# Patient Record
(demographics unavailable — no encounter records)

---

## 2024-10-10 NOTE — HISTORY OF PRESENT ILLNESS
[FreeTextEntry1] : aches and pain HTN DM follow up [de-identified] : shoulder  elbow aches and pains  rash persists used mupirocin some relief

## 2024-11-29 NOTE — DISCUSSION/SUMMARY
[FreeTextEntry1] : Tahira is referred  to dr shalom Matta or dr Ambriz. hypertension reasonable controls. .   5/17/23 would encourage patient to follow up with orthopedics for shoulder and cervical spine cardiac status stable  10/18/23 we will increase antihypertensives as indicated  EKG indicated for hypertension  this is a intermediate risk encounter based upon drug eval and mgment [EKG obtained to assist in diagnosis and management of assessed problem(s)] : EKG obtained to assist in diagnosis and management of assessed problem(s)

## 2024-11-29 NOTE — REASON FOR VISIT
[Hypertension] : hypertension [FreeTextEntry3] : dr reyes [FreeTextEntry1] : some left should pains on moment most consistent with musculoskeletal pains  update 6/9/22 we note systolic hypertension today of the readings at home have been better  update 9/8/22 blood pressure is poorly controlled will increase labetalol to 1500 mg per day. Claude reports right hand numbness she rubs it and it went away her finger was numb  update 2/101/23 complains of persistent atypical pains affecting arm positional in nature.   Update 11/10/2022 Claude has persistent pains mostly on elevating her left shoulder which sounds more like a musculoskeletal or even rotator cuff situation in addition to this she has numbness and tingling in her right hand which is of unclear significance the differential includes a musculoskeletal issue versus cardiac issue  update 5/17/23 pains and shoulder most likely musculoskeletal narrowed C4 C7 unstable C4 C5 MRI suggested of right shoulder she states however that she is "feeling better" in that regard  10/18/23 We note hypertension today and will increase labetalol to 3 (300) twice daily or 900 twice daily. Claude reluctant to undergo injections of her knee prefers to use a brace which is improving anecdotally heard a bad story about injection and would like to avoid waiting for bone-on-bone pains such as nighttime pains currently she says she does not have these.  1/17/2024 Claude lost a friend in [place] her blood pressures have been satisfactory at 136/75 blood sugar 110 on labetalol 900 twice daily probable whitecoat syndrome  5/15/24 claude has had significant weight loss on anorectic medication. her BP is stable and satisfactory.   11/27/24 Aleksander is noted to have severe 'white coat syndrome. her BP at home are satisfactroy

## 2024-11-29 NOTE — PHYSICAL EXAM
[General Appearance - Well Developed] : well developed [Normal Appearance] : normal appearance [Well Groomed] : well groomed [General Appearance - Well Nourished] : well nourished [No Deformities] : no deformities [General Appearance - In No Acute Distress] : no acute distress [Normal Conjunctiva] : the conjunctiva exhibited no abnormalities [Eyelids - No Xanthelasma] : the eyelids demonstrated no xanthelasmas [Normal Oral Mucosa] : normal oral mucosa [No Oral Pallor] : no oral pallor [No Oral Cyanosis] : no oral cyanosis [Normal Jugular Venous A Waves Present] : normal jugular venous A waves present [Normal Jugular Venous V Waves Present] : normal jugular venous V waves present [No Jugular Venous Figueroa A Waves] : no jugular venous figueroa A waves [Respiration, Rhythm And Depth] : normal respiratory rhythm and effort [Exaggerated Use Of Accessory Muscles For Inspiration] : no accessory muscle use [Auscultation Breath Sounds / Voice Sounds] : lungs were clear to auscultation bilaterally [Heart Rate And Rhythm] : heart rate and rhythm were normal [Heart Sounds] : normal S1 and S2 [Murmurs] : no murmurs present [Abdomen Soft] : soft [Abdomen Tenderness] : non-tender [Abdomen Mass (___ Cm)] : no abdominal mass palpated [Abnormal Walk] : normal gait [Gait - Sufficient For Exercise Testing] : the gait was sufficient for exercise testing [Nail Clubbing] : no clubbing of the fingernails [Cyanosis, Localized] : no localized cyanosis [Petechial Hemorrhages (___cm)] : no petechial hemorrhages [Skin Color & Pigmentation] : normal skin color and pigmentation [] : no rash [No Venous Stasis] : no venous stasis [Skin Lesions] : no skin lesions [No Skin Ulcers] : no skin ulcer [No Xanthoma] : no  xanthoma was observed [Oriented To Time, Place, And Person] : oriented to person, place, and time [Affect] : the affect was normal [Mood] : the mood was normal [No Anxiety] : not feeling anxious

## 2024-11-29 NOTE — ASSESSMENT
[FreeTextEntry1] : 1/17/2024 No new cardiac recommendations continue current course  5/15/24 No new cardiac recommendations continue current course  11/27/24 increase labetalol. edward will check he BP at home and call if greater than 150 systolic make 300 3 tabs bid  Medical necessity high risk encounter based upon drug evaluation and managment.

## 2025-01-09 NOTE — HISTORY OF PRESENT ILLNESS
[FreeTextEntry1] : hand numbness hearing not good [de-identified] : hands numbn in am not sleeping great

## 2025-01-28 NOTE — PHYSICAL EXAM
[General Appearance - Well Developed] : well developed [Normal Appearance] : normal appearance [Well Groomed] : well groomed [General Appearance - Well Nourished] : well nourished [No Deformities] : no deformities [General Appearance - In No Acute Distress] : no acute distress [Normal Conjunctiva] : the conjunctiva exhibited no abnormalities [Eyelids - No Xanthelasma] : the eyelids demonstrated no xanthelasmas [Normal Oral Mucosa] : normal oral mucosa [No Oral Pallor] : no oral pallor [No Oral Cyanosis] : no oral cyanosis [Normal Jugular Venous A Waves Present] : normal jugular venous A waves present [Normal Jugular Venous V Waves Present] : normal jugular venous V waves present [No Jugular Venous Figueroa A Waves] : no jugular venous figueroa A waves [Respiration, Rhythm And Depth] : normal respiratory rhythm and effort [Exaggerated Use Of Accessory Muscles For Inspiration] : no accessory muscle use [Heart Rate And Rhythm] : heart rate and rhythm were normal [Auscultation Breath Sounds / Voice Sounds] : lungs were clear to auscultation bilaterally [Heart Sounds] : normal S1 and S2 [Murmurs] : no murmurs present [Abdomen Soft] : soft [Abdomen Tenderness] : non-tender [Abdomen Mass (___ Cm)] : no abdominal mass palpated [Abnormal Walk] : normal gait [Gait - Sufficient For Exercise Testing] : the gait was sufficient for exercise testing [Nail Clubbing] : no clubbing of the fingernails [Cyanosis, Localized] : no localized cyanosis [Petechial Hemorrhages (___cm)] : no petechial hemorrhages [Skin Color & Pigmentation] : normal skin color and pigmentation [] : no rash [No Venous Stasis] : no venous stasis [Skin Lesions] : no skin lesions [No Skin Ulcers] : no skin ulcer [No Xanthoma] : no  xanthoma was observed [Oriented To Time, Place, And Person] : oriented to person, place, and time [Affect] : the affect was normal [Mood] : the mood was normal [No Anxiety] : not feeling anxious

## 2025-01-28 NOTE — ASSESSMENT
[FreeTextEntry1] : 1/17/2024 No new cardiac recommendations continue current course  5/15/24 No new cardiac recommendations continue current course  11/27/24 increase labetalol. Marjorie will check he BP at home and call if greater than 150 systolic make 300 3 tabs bid  1/28/2025 Increase labetalol to 900 twice daily White coat syndrome likely  Medical necessity Intermediate risk encounter based upon drug evaluation and management.

## 2025-01-28 NOTE — REASON FOR VISIT
[Hypertension] : hypertension [FreeTextEntry1] : some left should pains on moment most consistent with musculoskeletal pains  update 6/9/22 we note systolic hypertension today of the readings at home have been better  update 9/8/22 blood pressure is poorly controlled will increase labetalol to 1500 mg per day. Claude reports right hand numbness she rubs it and it went away her finger was numb  update 2/101/23 complains of persistent atypical pains affecting arm positional in nature.   Update 11/10/2022 Claude has persistent pains mostly on elevating her left shoulder which sounds more like a musculoskeletal or even rotator cuff situation in addition to this she has numbness and tingling in her right hand which is of unclear significance the differential includes a musculoskeletal issue versus cardiac issue  update 5/17/23 pains and shoulder most likely musculoskeletal narrowed C4 C7 unstable C4 C5 MRI suggested of right shoulder she states however that she is "feeling better" in that regard  10/18/23 We note hypertension today and will increase labetalol to 3 (300) twice daily or 900 twice daily. Claude reluctant to undergo injections of her knee prefers to use a brace which is improving anecdotally heard a bad story about injection and would like to avoid waiting for bone-on-bone pains such as nighttime pains currently she says she does not have these.  1/17/2024 Claude lost a friend in [place] her blood pressures have been satisfactory at 136/75 blood sugar 110 on labetalol 900 twice daily probable whitecoat syndrome  5/15/24 claude has had significant weight loss on anorectic medication. her BP is stable and satisfactory.   11/27/24 Aleksander is noted to have severe 'white coat syndrome. her BP at home are satisfactroy  1/28/2025 Claude doing well on a host of medications for bladder spasm and musculoskeletal pains including Celebrex oxybutynin will increase labetalol to 900 twice daily nor to affect better blood pressure control [FreeTextEntry3] : dr reyes

## 2025-03-25 NOTE — ASSESSMENT
[FreeTextEntry1] : 1/17/2024 No new cardiac recommendations continue current course  5/15/24 No new cardiac recommendations continue current course  11/27/24 increase labetalol. Marjorie will check he BP at home and call if greater than 150 systolic make 300 3 tabs bid  1/28/2025 Increase labetalol to 900 twice daily White coat syndrome likely  3/25/25 continue BP and lipid control.   Medical necessity Intermediate risk encounter based upon drug evaluation and management.

## 2025-03-25 NOTE — REASON FOR VISIT
[Hypertension] : hypertension [FreeTextEntry3] : dr reyes [FreeTextEntry1] : some left should pains on moment most consistent with musculoskeletal pains  update 6/9/22 we note systolic hypertension today of the readings at home have been better  update 9/8/22 blood pressure is poorly controlled will increase labetalol to 1500 mg per day. Claude reports right hand numbness she rubs it and it went away her finger was numb  update 2/101/23 complains of persistent atypical pains affecting arm positional in nature.   Update 11/10/2022 Claude has persistent pains mostly on elevating her left shoulder which sounds more like a musculoskeletal or even rotator cuff situation in addition to this she has numbness and tingling in her right hand which is of unclear significance the differential includes a musculoskeletal issue versus cardiac issue  update 5/17/23 pains and shoulder most likely musculoskeletal narrowed C4 C7 unstable C4 C5 MRI suggested of right shoulder she states however that she is "feeling better" in that regard  10/18/23 We note hypertension today and will increase labetalol to 3 (300) twice daily or 900 twice daily. Claude reluctant to undergo injections of her knee prefers to use a brace which is improving anecdotally heard a bad story about injection and would like to avoid waiting for bone-on-bone pains such as nighttime pains currently she says she does not have these.  1/17/2024 Claude lost a friend in [place] her blood pressures have been satisfactory at 136/75 blood sugar 110 on labetalol 900 twice daily probable whitecoat syndrome  5/15/24 claude has had significant weight loss on anorectic medication. her BP is stable and satisfactory.   11/27/24 Aleksander is noted to have severe 'white coat syndrome. her BP at home is satisfactory  1/28/2025 Claude doing well on a host of medications for bladder spasm and musculoskeletal pains including Celebrex oxybutynin will increase labetalol to 900 twice daily nor to affect better blood pressure control  3/25/25 reasoble blood pressure at home 130's ? white coat syndrome .  TG 63  LDL 58 A1C 7.7 avg

## 2025-04-14 NOTE — ASSESSMENT
[FreeTextEntry1] : Targets in patients 65 years and older: HbA1c 7 to 8%, BP < 140/90   HbA1c is at goal but patient has hypoglycemia so I decreased the dose of Humulin 75/25 pens BP is above goal but I will monitor this for now.   Patient is on statin and ACEi - no indication for Aspirin.    Last lipid panel - 03/08/2025 - Trig 63, LDL 58 Last HbA1c - 03/08/2025 - 7.7% Last Vitamin B12 - N/A Last urine albumin panel - July 2023 - positive Last BMP/CMP - March 2025 - Cr, K, AST, ALT N  Plan: 1. Decrease Humulin 75/25 mix pen to 16 units sc bid 2. Fingersticks to be done three times daily 3. Labs to be done in 3 months - see below 4. Follow up in 3 months to review results and meter

## 2025-04-14 NOTE — HISTORY OF PRESENT ILLNESS
[FreeTextEntry1] : Problems: 1. DM type 2 2. Hypertension 3. Hyperlipidemia 4. DM nephropathy (normal creatinine, positive urine microalbumin panel in July 2023)  DM type 2 1. Diagnosed in 2010s 2. Meds: Jardiance 10 mg po daily  - No UTIs or genital candidiasis  Humulog pens 75/25 mix - 17 units sc bid if BGs more than 120 mg/dl - PATIENT DOES NOT WANT TO SWITCH INSULIN TYPE FOR NOW.  PATIENT DOES NOT WANT TO USE GLP-1 AGONISTS FOR NOW - No pancreatitis, no personal or family history of medullary thyroid cancer Patient never used metformin 3. Fingersticks done twice per day - 60s to 170s, no hypoglycemic unawareness 4. Not on Aspirin, on atorvastatin 20 mg po daily, on lisinopril 20 mg po daily 5. Complications: Has DM nephropathy (normal creatinine, positive urine microalbumin panel in July 2023) No DM retinopathy (last eye exam was in 2023, patient advised on the need for annual DM eye exam) No ASCVD No foot ulcers/amputation 6. Patient never smoked cigarettes

## 2025-04-14 NOTE — PHYSICAL EXAM
[de-identified] : General: No distress, well nourished Eyes: Normal Sclera, EOMI, PERRL ENT: Normal appearance of the nose, normal oropharynx Neck/Thyroid: No cervical lymphadenopathy, thyroid gland 20 g in size, no thyroid nodules, non-tender Respiratory: No use of accessory muscles of respiration, vesicular breath sounds heard bilaterally, no crepitations or rhonchi Cardiovascular: S1 and S2 heard and normal, no S3 or S4, no murmurs, radial pulse normal bilaterally Abdomen: soft, non-tender, no masses, normal bowel sounds Musculoskeletal: No swelling or deformities of joints of hands, no pedal edema Neurological: Normal range of motion in the hands, Normal brachioradialis reflexes bilaterally Psychiatry: Patient converses normally, good judgement and insight Skin: No rashes in hands, no nodules palpated in hands  [de-identified] : DM foot exam done on 04/14/2025: No ulcers seen in feet Dorsalis pedis pulses normal bilaterally Sensation to 10 g monofilament normal in feet bilaterally

## 2025-05-20 NOTE — REASON FOR VISIT
[Hypertension] : hypertension [FreeTextEntry3] : dr reyes [FreeTextEntry1] : some left should pains on moment most consistent with musculoskeletal pains  update 6/9/22 we note systolic hypertension today of the readings at home have been better  update 9/8/22 blood pressure is poorly controlled will increase labetalol to 1500 mg per day. Claude reports right hand numbness she rubs it and it went away her finger was numb  update 2/101/23 complains of persistent atypical pains affecting arm positional in nature.   Update 11/10/2022 Claude has persistent pains mostly on elevating her left shoulder which sounds more like a musculoskeletal or even rotator cuff situation in addition to this she has numbness and tingling in her right hand which is of unclear significance the differential includes a musculoskeletal issue versus cardiac issue  update 5/17/23 pains and shoulder most likely musculoskeletal narrowed C4 C7 unstable C4 C5 MRI suggested of right shoulder she states however that she is "feeling better" in that regard  10/18/23 We note hypertension today and will increase labetalol to 3 (300) twice daily or 900 twice daily. Claude reluctant to undergo injections of her knee prefers to use a brace which is improving anecdotally heard a bad story about injection and would like to avoid waiting for bone-on-bone pains such as nighttime pains currently she says she does not have these.  1/17/2024 Claude lost a friend in [place] her blood pressures have been satisfactory at 136/75 blood sugar 110 on labetalol 900 twice daily probable whitecoat syndrome  5/15/24 claude has had significant weight loss on anorectic medication. her BP is stable and satisfactory.   11/27/24 Aleksander is noted to have severe 'white coat syndrome. her BP at home is satisfactory  1/28/2025 Claude doing well on a host of medications for bladder spasm and musculoskeletal pains including Celebrex oxybutynin will increase labetalol to 900 twice daily nor to affect better blood pressure control  3/25/25 reasoble blood pressure at home 130's ? white coat syndrome .  TG 63  LDL 58 A1C 7.7 avg   5/20/2025 Radha recently noted marked systolic hypertension and she was brought to urgent care center both she and urgent care suggested ER evaluation however Claude deferred today Robson has similar hypertension and again she refuses ER evaluation previously she responded to clonidine 0.1 mg with a marked reduction in blood pressure

## 2025-05-20 NOTE — ASSESSMENT
[FreeTextEntry1] : 1/17/2024 No new cardiac recommendations continue current course  5/15/24 No new cardiac recommendations continue current course  11/27/24 increase labetalol. Marjorie will check he BP at home and call if greater than 150 systolic make 300 3 tabs bid  1/28/2025 Increase labetalol to 900 twice daily White coat syndrome likely  3/25/25 continue BP and lipid control.   5/20/2025 I have increased labetalol to maximal doses i.e. 900 mg twice daily and 600 mg in the afternoon making a total dose of 2400 mg/day with hold for BP of 90 systolic or heart rate less than 50 in addition would consider clonidine as an adjunct although would hope to avoid given side effects noted she has some fogginess and we wonder about hypertension versus medication effects or combination thereof she was instructed to go to ER based upon signs and symptoms such as chest pain shortness of breath or neurologic symptoms issues discussed with her daughter Radha at the bedside and she realizes that Tahira is refusing ER evaluation today.  She was seeming close cardiac follow-up in 1 week time and will continue to monitor her blood pressure although she probably does have a component of whitecoat syndrome and a layer of anxiety.  Tahira probably has refractory hypertension and secondary causes should be started after such as renal artery stenosis and ultrasound is requested  Medical necessity Intermediate risk encounter based upon drug evaluation and management.

## 2025-05-20 NOTE — DISCUSSION/SUMMARY
[EKG obtained to assist in diagnosis and management of assessed problem(s)] : EKG obtained to assist in diagnosis and management of assessed problem(s) [FreeTextEntry1] : Tahira is referred  to dr shalom Matta or dr Ambriz. hypertension reasonable controls. .   5/17/23 would encourage patient to follow up with orthopedics for shoulder and cervical spine cardiac status stable  10/18/23 we will increase antihypertensives as indicated  EKG indicated for hypertension  this is a intermediate risk encounter based upon drug eval and mgment

## 2025-06-08 NOTE — REASON FOR VISIT
[Hypertension] : hypertension [FreeTextEntry3] : dr reyes [FreeTextEntry1] : some left should pains on moment most consistent with musculoskeletal pains  update 6/9/22 we note systolic hypertension today of the readings at home have been better  update 9/8/22 blood pressure is poorly controlled will increase labetalol to 1500 mg per day. Claude reports right hand numbness she rubs it and it went away her finger was numb  update 2/101/23 complains of persistent atypical pains affecting arm positional in nature.   Update 11/10/2022 Claude has persistent pains mostly on elevating her left shoulder which sounds more like a musculoskeletal or even rotator cuff situation in addition to this she has numbness and tingling in her right hand which is of unclear significance the differential includes a musculoskeletal issue versus cardiac issue  update 5/17/23 pains and shoulder most likely musculoskeletal narrowed C4 C7 unstable C4 C5 MRI suggested of right shoulder she states however that she is "feeling better" in that regard  10/18/23 We note hypertension today and will increase labetalol to 3 (300) twice daily or 900 twice daily. Claude reluctant to undergo injections of her knee prefers to use a brace which is improving anecdotally heard a bad story about injection and would like to avoid waiting for bone-on-bone pains such as nighttime pains currently she says she does not have these.  1/17/2024 Claude lost a friend in [place] her blood pressures have been satisfactory at 136/75 blood sugar 110 on labetalol 900 twice daily probable whitecoat syndrome  5/15/24 claude has had significant weight loss on anorectic medication. her BP is stable and satisfactory.   11/27/24 Aleksander is noted to have severe 'white coat syndrome. her BP at home is satisfactory  1/28/2025 Claude doing well on a host of medications for bladder spasm and musculoskeletal pains including Celebrex oxybutynin will increase labetalol to 900 twice daily nor to affect better blood pressure control  3/25/25 reasonable blood pressure at home 130's ? white coat syndrome .  TG 63  LDL 58 A1C 7.7 avg   5/20/2025 Radha recently noted marked systolic hypertension and she was brought to urgent care center both she and urgent care suggested ER evaluation however Claude deferred today Robson has similar hypertension and again she refuses ER evaluation previously she responded to clonidine 0.1 mg with a marked reduction in blood pressure  6/5/25 hypertensive todayl would resume meds. renal sono neg

## 2025-06-08 NOTE — PHYSICAL EXAM
[General Appearance - Well Developed] : well developed [Normal Appearance] : normal appearance [Well Groomed] : well groomed [General Appearance - Well Nourished] : well nourished [No Deformities] : no deformities [General Appearance - In No Acute Distress] : no acute distress [Normal Conjunctiva] : the conjunctiva exhibited no abnormalities [Eyelids - No Xanthelasma] : the eyelids demonstrated no xanthelasmas [Normal Oral Mucosa] : normal oral mucosa [No Oral Pallor] : no oral pallor [No Oral Cyanosis] : no oral cyanosis [Normal Jugular Venous A Waves Present] : normal jugular venous A waves present [Normal Jugular Venous V Waves Present] : normal jugular venous V waves present [No Jugular Venous Figueroa A Waves] : no jugular venous figueroa A waves [Respiration, Rhythm And Depth] : normal respiratory rhythm and effort [Exaggerated Use Of Accessory Muscles For Inspiration] : no accessory muscle use [Auscultation Breath Sounds / Voice Sounds] : lungs were clear to auscultation bilaterally [Heart Rate And Rhythm] : heart rate and rhythm were normal [Heart Sounds] : normal S1 and S2 [Murmurs] : no murmurs present [Abdomen Soft] : soft [Abdomen Tenderness] : non-tender [Abdomen Mass (___ Cm)] : no abdominal mass palpated [Abnormal Walk] : normal gait [Gait - Sufficient For Exercise Testing] : the gait was sufficient for exercise testing [Nail Clubbing] : no clubbing of the fingernails [Cyanosis, Localized] : no localized cyanosis [Petechial Hemorrhages (___cm)] : no petechial hemorrhages [Skin Color & Pigmentation] : normal skin color and pigmentation [] : no rash [No Venous Stasis] : no venous stasis [Skin Lesions] : no skin lesions [No Skin Ulcers] : no skin ulcer [No Xanthoma] : no  xanthoma was observed [Oriented To Time, Place, And Person] : oriented to person, place, and time [Affect] : the affect was normal [Mood] : the mood was normal [No Anxiety] : not feeling anxious done

## 2025-06-08 NOTE — ASSESSMENT
[FreeTextEntry1] : 1/17/2024 No new cardiac recommendations continue current course  5/15/24 No new cardiac recommendations continue current course  11/27/24 increase labetalol. Marjorie will check he BP at home and call if greater than 150 systolic make 300 3 tabs bid  1/28/2025 Increase labetalol to 900 twice daily White coat syndrome likely  3/25/25 continue BP and lipid control.   5/20/2025 I have increased labetalol to maximal doses i.e. 900 mg twice daily and 600 mg in the afternoon making a total dose of 2400 mg/day with hold for BP of 90 systolic or heart rate less than 50 in addition would consider clonidine as an adjunct although would hope to avoid given side effects noted she has some fogginess and we wonder about hypertension versus medication effects or combination thereof she was instructed to go to ER based upon signs and symptoms such as chest pain shortness of breath or neurologic symptoms issues discussed with her daughter Mario at the bedside and she realizes that Tahira is refusing ER evaluation today.  She was seeming close cardiac follow-up in 1 week time and will continue to monitor her blood pressure although she probably does have a component of whitecoat syndrome and a layer of anxiety.  Tahira probably has refractory hypertension and secondary causes should be started after such as renal artery stenosis and ultrasound is requested  6/5/25 resume antihypertensives. discussed with mario at bedside. will call if unresponse. ER if signs and symptoms warrant consider clonidine ? side effect profile  Medical necessity Intermediate risk encounter based upon drug evaluation and management.  Tumor Debulked?: curette